# Patient Record
Sex: FEMALE | Race: WHITE | NOT HISPANIC OR LATINO
[De-identification: names, ages, dates, MRNs, and addresses within clinical notes are randomized per-mention and may not be internally consistent; named-entity substitution may affect disease eponyms.]

---

## 2024-01-23 ENCOUNTER — NON-APPOINTMENT (OUTPATIENT)
Age: 34
End: 2024-01-23

## 2024-01-23 ENCOUNTER — APPOINTMENT (OUTPATIENT)
Dept: OPHTHALMOLOGY | Facility: CLINIC | Age: 34
End: 2024-01-23
Payer: COMMERCIAL

## 2024-01-23 PROCEDURE — 92004 COMPRE OPH EXAM NEW PT 1/>: CPT

## 2024-01-23 PROCEDURE — 92201 OPSCPY EXTND RTA DRAW UNI/BI: CPT

## 2024-01-23 PROCEDURE — 92134 CPTRZ OPH DX IMG PST SGM RTA: CPT

## 2024-01-23 PROCEDURE — 76512 OPH US DX B-SCAN: CPT | Mod: LT

## 2024-02-06 ENCOUNTER — TRANSCRIPTION ENCOUNTER (OUTPATIENT)
Age: 34
End: 2024-02-06

## 2024-02-09 ENCOUNTER — APPOINTMENT (OUTPATIENT)
Dept: OPHTHALMOLOGY | Facility: CLINIC | Age: 34
End: 2024-02-09
Payer: COMMERCIAL

## 2024-02-09 ENCOUNTER — NON-APPOINTMENT (OUTPATIENT)
Age: 34
End: 2024-02-09

## 2024-02-09 ENCOUNTER — APPOINTMENT (OUTPATIENT)
Dept: OPHTHALMOLOGY | Facility: CLINIC | Age: 34
End: 2024-02-09
Payer: SELF-PAY

## 2024-02-09 PROCEDURE — 92134 CPTRZ OPH DX IMG PST SGM RTA: CPT

## 2024-02-09 PROCEDURE — 92060 SENSORIMOTOR EXAMINATION: CPT

## 2024-02-09 PROCEDURE — 92014 COMPRE OPH EXAM EST PT 1/>: CPT

## 2024-02-09 PROCEDURE — 92083 EXTENDED VISUAL FIELD XM: CPT

## 2024-02-09 PROCEDURE — 92015 DETERMINE REFRACTIVE STATE: CPT

## 2024-02-13 ENCOUNTER — APPOINTMENT (OUTPATIENT)
Dept: OPHTHALMOLOGY | Facility: CLINIC | Age: 34
End: 2024-02-13

## 2024-02-21 PROBLEM — Z00.00 ENCOUNTER FOR PREVENTIVE HEALTH EXAMINATION: Status: ACTIVE | Noted: 2024-02-21

## 2025-05-07 ENCOUNTER — NON-APPOINTMENT (OUTPATIENT)
Age: 35
End: 2025-05-07

## 2025-05-08 ENCOUNTER — APPOINTMENT (OUTPATIENT)
Dept: OPHTHALMOLOGY | Facility: CLINIC | Age: 35
End: 2025-05-08
Payer: COMMERCIAL

## 2025-05-08 ENCOUNTER — NON-APPOINTMENT (OUTPATIENT)
Age: 35
End: 2025-05-08

## 2025-05-08 PROCEDURE — 92014 COMPRE OPH EXAM EST PT 1/>: CPT

## 2025-05-08 PROCEDURE — 92201 OPSCPY EXTND RTA DRAW UNI/BI: CPT

## 2025-05-08 PROCEDURE — 92134 CPTRZ OPH DX IMG PST SGM RTA: CPT

## 2025-05-09 ENCOUNTER — NON-APPOINTMENT (OUTPATIENT)
Age: 35
End: 2025-05-09

## 2025-05-09 ENCOUNTER — APPOINTMENT (OUTPATIENT)
Dept: OPHTHALMOLOGY | Facility: CLINIC | Age: 35
End: 2025-05-09
Payer: COMMERCIAL

## 2025-05-09 PROCEDURE — 92134 CPTRZ OPH DX IMG PST SGM RTA: CPT

## 2025-05-09 PROCEDURE — 76512 OPH US DX B-SCAN: CPT | Mod: LT

## 2025-05-09 PROCEDURE — 92014 COMPRE OPH EXAM EST PT 1/>: CPT

## 2025-05-13 RX ORDER — SODIUM CHLORIDE 9 G/1000ML
1000 INJECTION, SOLUTION INTRAVENOUS
Refills: 0 | Status: DISCONTINUED | OUTPATIENT
Start: 2025-05-15 | End: 2025-05-15

## 2025-05-14 NOTE — ASU PATIENT PROFILE, ADULT - NSICDXPASTSURGICALHX_GEN_ALL_CORE_FT
PAST SURGICAL HISTORY:  Ruptured ovarian cyst     S/P appendectomy     S/P tonsillectomy     Torsion of ovary     
clothing

## 2025-05-14 NOTE — ASU PATIENT PROFILE, ADULT - NS PREOP UNDERSTANDS INFO
We are located at [address] [place] [address], between [address] on the first floor,  parking is 22/8 hours, bring picture ID, insurance card, Debit or credit card, escort needs picture ID, No solids or dairy products after midnight, water until 0700. No make-up, jewelry, creams lotions powders, colognes, hard candies, chewing gum, nail polish. Wear comfortable clothing  Call back [phone]/yes

## 2025-05-15 ENCOUNTER — OUTPATIENT (OUTPATIENT)
Dept: OUTPATIENT SERVICES | Facility: HOSPITAL | Age: 35
LOS: 1 days | Discharge: HOME | End: 2025-05-15
Payer: COMMERCIAL

## 2025-05-15 ENCOUNTER — NON-APPOINTMENT (OUTPATIENT)
Age: 35
End: 2025-05-15

## 2025-05-15 ENCOUNTER — RESULT REVIEW (OUTPATIENT)
Age: 35
End: 2025-05-15

## 2025-05-15 ENCOUNTER — APPOINTMENT (OUTPATIENT)
Dept: OPHTHALMOLOGY | Facility: AMBULATORY SURGERY CENTER | Age: 35
End: 2025-05-15

## 2025-05-15 ENCOUNTER — TRANSCRIPTION ENCOUNTER (OUTPATIENT)
Age: 35
End: 2025-05-15

## 2025-05-15 VITALS
RESPIRATION RATE: 16 BRPM | OXYGEN SATURATION: 99 % | SYSTOLIC BLOOD PRESSURE: 94 MMHG | TEMPERATURE: 97 F | DIASTOLIC BLOOD PRESSURE: 53 MMHG | HEART RATE: 65 BPM

## 2025-05-15 VITALS
WEIGHT: 132.06 LBS | HEART RATE: 81 BPM | HEIGHT: 67 IN | SYSTOLIC BLOOD PRESSURE: 134 MMHG | OXYGEN SATURATION: 98 % | RESPIRATION RATE: 15 BRPM | DIASTOLIC BLOOD PRESSURE: 66 MMHG | TEMPERATURE: 99 F

## 2025-05-15 DIAGNOSIS — N83.209 UNSPECIFIED OVARIAN CYST, UNSPECIFIED SIDE: Chronic | ICD-10-CM

## 2025-05-15 DIAGNOSIS — Z90.49 ACQUIRED ABSENCE OF OTHER SPECIFIED PARTS OF DIGESTIVE TRACT: Chronic | ICD-10-CM

## 2025-05-15 DIAGNOSIS — Z90.89 ACQUIRED ABSENCE OF OTHER ORGANS: Chronic | ICD-10-CM

## 2025-05-15 DIAGNOSIS — N83.519 TORSION OF OVARY AND OVARIAN PEDICLE, UNSPECIFIED SIDE: Chronic | ICD-10-CM

## 2025-05-15 PROCEDURE — 88300 SURGICAL PATH GROSS: CPT | Mod: 26

## 2025-05-15 PROCEDURE — 66852 REMOVAL OF LENS MATERIAL: CPT | Mod: LT

## 2025-05-15 PROCEDURE — 66985 INSERT LENS PROSTHESIS: CPT | Mod: LT,59

## 2025-05-15 DEVICE — IMPLANTABLE DEVICE: Type: IMPLANTABLE DEVICE | Status: FUNCTIONAL

## 2025-05-15 RX ORDER — TROPICAMIDE
1 POWDER (GRAM) MISCELLANEOUS
Refills: 0 | Status: COMPLETED | OUTPATIENT
Start: 2025-05-15 | End: 2025-05-15

## 2025-05-15 RX ORDER — PHENYLEPHRINE HYDROCHLORIDE 25 MG/ML
1 SOLUTION OPHTHALMIC
Refills: 0 | Status: COMPLETED | OUTPATIENT
Start: 2025-05-15 | End: 2025-05-15

## 2025-05-15 RX ORDER — OFLOXACIN 3 MG/ML
1 SOLUTION OPHTHALMIC
Refills: 0 | Status: COMPLETED | OUTPATIENT
Start: 2025-05-15 | End: 2025-05-15

## 2025-05-15 RX ORDER — ATROPINE SULFATE 1 %
1 OINTMENT (GRAM) OPHTHALMIC (EYE)
Refills: 0 | Status: COMPLETED | OUTPATIENT
Start: 2025-05-15 | End: 2025-05-15

## 2025-05-15 RX ORDER — APREPITANT 40 MG/1
40 CAPSULE ORAL ONCE
Refills: 0 | Status: COMPLETED | OUTPATIENT
Start: 2025-05-15 | End: 2025-05-15

## 2025-05-15 RX ORDER — ACETAMINOPHEN 500 MG/5ML
650 LIQUID (ML) ORAL ONCE
Refills: 0 | Status: DISCONTINUED | OUTPATIENT
Start: 2025-05-15 | End: 2025-05-15

## 2025-05-15 RX ORDER — TETRACAINE HYDROCHLORIDE 5 MG/ML
1 SOLUTION OPHTHALMIC ONCE
Refills: 0 | Status: COMPLETED | OUTPATIENT
Start: 2025-05-15 | End: 2025-05-15

## 2025-05-15 RX ADMIN — OFLOXACIN 1 DROP(S): 3 SOLUTION OPHTHALMIC at 09:47

## 2025-05-15 RX ADMIN — Medication 1 DROP(S): at 09:46

## 2025-05-15 RX ADMIN — Medication 1 DROP(S): at 09:52

## 2025-05-15 RX ADMIN — Medication 1 DROP(S): at 09:58

## 2025-05-15 RX ADMIN — OFLOXACIN 1 DROP(S): 3 SOLUTION OPHTHALMIC at 09:57

## 2025-05-15 RX ADMIN — PHENYLEPHRINE HYDROCHLORIDE 1 DROP(S): 25 SOLUTION OPHTHALMIC at 09:47

## 2025-05-15 RX ADMIN — PHENYLEPHRINE HYDROCHLORIDE 1 DROP(S): 25 SOLUTION OPHTHALMIC at 09:52

## 2025-05-15 RX ADMIN — TETRACAINE HYDROCHLORIDE 1 DROP(S): 5 SOLUTION OPHTHALMIC at 09:47

## 2025-05-15 RX ADMIN — OFLOXACIN 1 DROP(S): 3 SOLUTION OPHTHALMIC at 09:52

## 2025-05-15 RX ADMIN — Medication 1 DROP(S): at 09:47

## 2025-05-15 RX ADMIN — Medication 1 DROP(S): at 09:57

## 2025-05-15 RX ADMIN — APREPITANT 40 MILLIGRAM(S): 40 CAPSULE ORAL at 09:45

## 2025-05-15 RX ADMIN — PHENYLEPHRINE HYDROCHLORIDE 1 DROP(S): 25 SOLUTION OPHTHALMIC at 09:58

## 2025-05-15 NOTE — PRE-ANESTHESIA EVALUATION ADULT - NSANTHOBSERVEDRD_ENT_A_CORE
No
[TextEntry] :  1/13/2023: [facility] chest x-ray-no acute pulmonary disease 1/14/2023: [facility] CT angiography-3 mm left upper lobe nodule, emphysematous changes, fatty liver, small hiatal hernia (films reviewed on PACS)    12/1/2022: [facility] chest CT without contrast parabronchial thickening and mucoid impaction with emphysema consistent with smoking.  No pneumonia.  4 mm fluid left upper lobe nodule most likely benign.  (Films reviewed on PACS)

## 2025-05-15 NOTE — ASU DISCHARGE PLAN (ADULT/PEDIATRIC) - FINANCIAL ASSISTANCE
[facility] provides services at a reduced cost to those who are determined to be eligible through [facility]’s financial assistance program. Information regarding [facility]’s financial assistance program can be found by going to [email or web address] or by calling [phone].

## 2025-05-15 NOTE — OPERATIVE REPORT - OPERATIVE RPOSRT DETAILS
Dislocated IOL and IOL exchange       PATIENT NAME: LONNIE MARTIN    ATTENDING: Dejah Jamil MD    PREOPERATIVE DIAGNOSIS(ES):  Dislocated intraocular lens, left eye    POSTOPERATIVE DIAGNOSIS(ES):  As above    PROCEDURE: Pars plana vitrectomy, IOL exchange with scleral fixated IOL -  all of the left eye     INDICATIONS:       The patient is a 34y year old Female with a history of a dislocated intraocular lens in the operative eye. After a detailed review of the risks, benefits and alternatives of the procedure, including but not limited to bleeding, infection, inflammation, retinal detachment, loss of vision, loss of eye, double vision, unclear visual potential, need for further surgery, and systemic risks of anesthesia and surgery, informed consent was obtained and the patient elected to proceed with the surgery.     PROCEDURE:       On the day of surgery, after clearance by medicine and anesthesia, the patient was brought to the operating room in stable condition. After verifying the correct surgical site, the patient was placed in the supine position. Intravenous sedation was provided by the anesthesia team.  After a brief time-out, a 1:1 mixture of 2% lidocaine and 0.75% Marcaine was injected in a retrobulbar fashion behind the operative eye. The patient was then prepped and draped in the usual sterile fashion.  Eyelashes were draped out of the operative field and a stainless steel eyelid speculum was placed in the operative eye.  A time-out was performed verifying correct patient, procedure, site, positioning and special equipment prior to starting the case.      A toric marker was used to carlos the horizontal meridian at 3 and 9 o’clock on the cornea. A caliper was then used to carlos 3 mm posterior to the limbus and 5 mm apart at these locations with a surgical marking pen. A limbal peritomy was made temporally and nasally and external cautery was used for hemostasis. Care was taken to avoid prior sclerotomies and areas of scleral thinning.    A 25-gauge microcannula was placed in the inferotemporal quadrant in a beveled fashion 3.5 mm posterior to the limbus. The infusion cannula was cleared of air, inserted into the microcannula, confirmed to be in the correct position within the vitreous cavity by direct visualization through the dilated pupil with the light pipe and then turned on under direct visualization. Two additional microcannulas were placed superonasally and superotemporally in a similar fashion (parallel to limbus) at the previously marked locations.     Then, under indirect wide field visualization, a core and peripheral vitrectomy was performed. The posterior hyaloid was confirmed to be   using active aspiration and Kenalog assistance.  The vitreous was excised 360 degrees to the posterior vitreous base and no retinal breaks were noted with scleral depression.    The infusion was closed and Healon was placed into the anterior chamber. The intraocular lens implant was elevated into the anterior chamber.  A 3.2 mm keratome was then used to make a biplanar clear corneal incision of 3.6 mm and the intraocular lens was rotated out of the eye and send to pathology as a surgical specimen.    The remained capsular bag was removed. Then, under indirect wide field visualization, 360 degrees scleral depression was again performed. There were no visible tears, breaks or holes.    The infusion was closed and Healon was placed into the anterior chamber. A 3.2 mm keratome was then used to make a biplanar clear corneal incision of 3.6 mm and the intraocular lens was cut in a pac-man fashion and then rotated out of the eye and send to pathology as a surgical specimen.    The trocars were then used to make the inferior sclerotomies. A CV8 Goretex suture was cut into thirds and threaded into the eyelets of the IOL in an "U" fashion. The lens was an Akreos 16.0D, Serial number [id number] Expiration date 9-30-26. The sutures were then carefully fed into the eye in a hand over fist fashion and then externalized through the sclerotomies. The sutures were adjusted until the lens was well centered and then locked. The knots were then rotated into the eye. The superior sclerotomies were then sutured with an 8-0 vicryl and were watertight.     A 10-nylon was then used to close the main wound in an x-stitch fashion. The edges of the suture were trimmed and the knot was buried.     The overlying conjunctiva was reapproximated and the inferior microcannula and associated infusion line was removed from the   eye and sutured closed with an 8-0 vicryl. Pre-filtered miochol was placed into the eye and the iris was noted to be round. The corneal wounds were checked and confirmed to be watertight.      All sclerotomies remained watertight and the eye remained at a physiologic pressure by palpation.     The eyelid speculum was removed from the eye. Betadine was cleaned from around the eye. A topical steroid/antibiotic cream was placed on the eye, followed by a patch and a clear plastic shield. The patient tolerated the procedure well and left the operating room in stable condition.

## 2025-05-16 ENCOUNTER — APPOINTMENT (OUTPATIENT)
Dept: OPHTHALMOLOGY | Facility: CLINIC | Age: 35
End: 2025-05-16
Payer: COMMERCIAL

## 2025-05-16 ENCOUNTER — NON-APPOINTMENT (OUTPATIENT)
Age: 35
End: 2025-05-16

## 2025-05-16 PROCEDURE — 99024 POSTOP FOLLOW-UP VISIT: CPT

## (undated) DEVICE — SOL IRR SALT BSS PLUS 500ML

## (undated) DEVICE — SUT PROLENE 10-0 4" CTC-6L

## (undated) DEVICE — SUT VICRYL 8-0 12" TG140-8 DA

## (undated) DEVICE — LENS VOLK1 FLAT 1X STRL

## (undated) DEVICE — PACK VITRECTOMY  LF

## (undated) DEVICE — SUT ETHILON 10-0 12" CS160-6

## (undated) DEVICE — WARMING BLANKET LOWER ADULT

## (undated) DEVICE — GLV 7.5 PROTEXIS (WHITE)

## (undated) DEVICE — SOL IRR BAL SALT 500ML

## (undated) DEVICE — CANNULA SURGICAL SPECIALTIES DUAL BORE 25G

## (undated) DEVICE — SUT NYLON 10-0 12" CU-5

## (undated) DEVICE — RETRACTOR SYNERGETICS IRIS FLEXIBLE DISP

## (undated) DEVICE — SYR FILTER 22 MICRONS

## (undated) DEVICE — FORCEP GRIESHABER MAXGRIP 25GA DISP

## (undated) DEVICE — DRAPE MICROSCOPE KNOB COVER SMALL (2 PCS)

## (undated) DEVICE — KIT CENTURION ANTERIOR

## (undated) DEVICE — BACKFLUSH SOFT TIP 25G

## (undated) DEVICE — ILM FORCEP 25G

## (undated) DEVICE — MILLEX HA 45UM

## (undated) DEVICE — Device

## (undated) DEVICE — CAPSULE RETRACTOR MST

## (undated) DEVICE — PICK MICRO .5MM 25G

## (undated) DEVICE — PACK CENTURION FMS ACT.9 30 BAL

## (undated) DEVICE — CANNULA MEDONE DUAL BORE SIDEFLO 25G

## (undated) DEVICE — ILM FORCEP 25G PLUS

## (undated) DEVICE — DRSG TAPE MICROPORE SURGICAL TAPE .5"X10 YDS TAN

## (undated) DEVICE — PACK ANTERIOR SEGMENT

## (undated) DEVICE — VENODYNE/SCD SLEEVE CALF MEDIUM